# Patient Record
Sex: MALE
[De-identification: names, ages, dates, MRNs, and addresses within clinical notes are randomized per-mention and may not be internally consistent; named-entity substitution may affect disease eponyms.]

---

## 2018-05-03 ENCOUNTER — HOSPITAL ENCOUNTER (OUTPATIENT)
Dept: HOSPITAL 25 - OREAST | Age: 53
Discharge: HOME | End: 2018-05-03
Attending: ORTHOPAEDIC SURGERY
Payer: COMMERCIAL

## 2018-05-03 VITALS — DIASTOLIC BLOOD PRESSURE: 71 MMHG | SYSTOLIC BLOOD PRESSURE: 118 MMHG

## 2018-05-03 DIAGNOSIS — M65.311: ICD-10-CM

## 2018-05-03 DIAGNOSIS — M18.0: Primary | ICD-10-CM

## 2018-05-03 DIAGNOSIS — M19.90: ICD-10-CM

## 2018-05-03 DIAGNOSIS — F17.210: ICD-10-CM

## 2018-05-03 DIAGNOSIS — M72.0: ICD-10-CM

## 2018-05-03 DIAGNOSIS — M24.241: ICD-10-CM

## 2018-05-03 PROCEDURE — 88304 TISSUE EXAM BY PATHOLOGIST: CPT

## 2018-05-03 PROCEDURE — 76000 FLUOROSCOPY <1 HR PHYS/QHP: CPT

## 2018-05-03 PROCEDURE — C1769 GUIDE WIRE: HCPCS

## 2018-05-03 PROCEDURE — C1776 JOINT DEVICE (IMPLANTABLE): HCPCS

## 2018-05-03 PROCEDURE — C1713 ANCHOR/SCREW BN/BN,TIS/BN: HCPCS

## 2018-05-04 NOTE — RAD
INDICATION: Right thumb surgery     



COMPARISON: February 27, 2018



FINDINGS: 35 seconds of fluoroscopy were provided for the orthopedics department.

Fluoroscopic spot imaging of the right hand were obtained for operative control.



CPT II Codes:  (fluoro time doc)

## 2018-05-08 NOTE — OP
DATE OF OPERATION:  05/03/18 - [facility]

 

DATE OF BIRTH:  [birth year].

 

SURGEON:  Rajiv Marroquin MD.

 

ASSISTANT:  CUATE Dennis.  An assistant was needed for the entirety of the 
procedure to aid in positioning of the arm and retraction.

 

ANESTHESIA:  General.

 

PRE-OP DIAGNOSES:

1.  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

2.  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 
to chronic radial collateral ligament incompetency.

3.  Right trigger thumb, improved with injection, but now is recurring.

4.  Dupuytren's disease, right hand first webspace, with resultant first 
webspace contracture.

 

POST-OP DIAGNOSES:

1.  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

2.  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 
to chronic radial collateral ligament incompetency.

3.  Right trigger thumb, improved with injection, but now is recurring.

4.  Dupuytren's disease, right hand first webspace, with resultant first 
webspace contracture.

 

OPERATIVE PROCEDURE:

1.  Right thumb carpometacarpal arthroplasty. Please note that the metacarpal 
bone had been pinned into a now reduced position as prior surgery in the 
setting of extremely abducted and dorsal almost to the base of the second 
metatarsal and the trapezoid bone.  It took an extensive more time to release, 
to reduce, and with suspension more difficult than a typical CMC joint 
arthroplasty.

2.  Distally based flexor carpi radialis tendon transfer for thumb suspension 
and tendon interposition.

2.  Right thumb metacarpophalangeal joint fusion with Acutrak screw.

3.  Right trigger thumb release of A1 pulley.

4.  Excision of Dupuytren's disease of right hand involving the first webspace 
for release of first webspace contracture.

5.  Closure with Z-plasty.

 

INDICATIONS:  Fredi is 52.  He had a right thumb CMC joint arthroplasty done 
back a couple of years ago.  It has never been right ever since it was done. 
The thumb was set in a very odd position. The base of the metacarpal is 
palpable almost dorsal to the trapezoid bone.  There was significant pain 
related to MCP joint DJD.  He did have a trigger thumb pain.  He felt 
substantially better with a steroid injection, but now it is recurring.  
Additionally, he has contracture of the first web space due to Dupuytren's 
disease.

 

ESTIMATED BLOOD LOSS:  2 mL.

 

COMPLICATIONS:  None.

 

FINDINGS:  See above and below.

 

DESCRIPTION OF PROCEDURE:  Fredi was seen in the preoperative holding area.  
The correct side, site and the procedure were identified.  After anesthesia was 
induced, the arm was prepped and draped in the usual fashion.  A time-out was 
performed.

 

The arm was exsanguinated and the tourniquet was inflated.  I then made a 1 cm 
transverse incision in the MCP joint flexion crease.  Dissection was carried 
down. There was a little band of Dupuytren's tissue coming over the A1 pulley; 
this was released.  Full thickness flaps were raised.  The digital nerves were 
protected.  I then released the A1 pulley longitudinally and extended the 
release proximally and distally with the tenotomy scissors.  Once I had 
completed the trigger thumb release, I turned my attention to the Dupuytren's 
disease.

 

I made a transverse incision over the first webspace.  Dissection was carried 
and the skin was released from off the Dupuytren's cord.  I then excised the 
Dupuytren's cord in its entirety.  The digital nerves were protected during the 
excision.  After I had the Dupuytren's disease excised, the contracture opened 
up. The skin was still tight.  Hence, I went ahead and performed a Z-plasty to 
gain some length in the skin closure.  I also closed the trigger thumb release 
wound at this time.

 

I then turned my attention to the base of the joint.  This was very 
degenerative. It was a very large comma type incision extending from the base 
of the thumb metacarpal down in a Johansen type fashion terminating over the FCR 
tendon sheath. The FCR tendon was readily palpable and was intact.  I went 
ahead and brought my incision back dorsally just dorsal to the first dorsal 
compartment tendons.  I used the interval between the first dorsal compartment 
tendons in the EPL.  Full thickness flaps were raised subperiosteally.  There 
was abundant scar tissue as was to be expected at the site of the prior 
arthroplasty, where the trapeziectomy had been performed.  I went ahead and 
debrided all this out.  I then used my [place] blade to mobilize the base of the 
first metacarpal.  This took an extensive amount of time much longer than 
normal.  It was quite difficult, but ultimately I was able to get the thumb 
base mobilized down to the correct reduced position.  The FCR tendon was 
located in the base of the wound after the debridement and it looked native and 
looked like it has never been used previously.  Once I did have the thumb in 
the reduced position, I took sequentially larger drill bits and placed a bone 
tunnel from the proximal radial base of the dorsum of the first metacarpal 
exiting out the proximal ulnar portion of the articular surface of the base of 
the metacarpal.  I then made a 1 cm transverse incision just proximal to the 
wrist flexion crease.  The sheath over the FCR tendon was released.  The tendon 
was split.  A 26-gauge wire was placed in the tendon split, then came proximally
, and made 2 more transverse incisions.  The sheath was released along its 
entirety.  The 26-gauge wire was then delivered into the skin.  This split the 
tendon and released at the proximal musculo-tendinous junction.  The tendon was 
debrided off this.  The tendon was secured with 2-0 Ethibond suture.  The 
tendon tail was delivered down into the base of the wound and the tendon was 
split all the way to the base of the second metacarpal.  I then brought my 
tendon through the bony tunnel and then around the intact limb of the FCR 
tendon. Appropriate tension was set and the tendon transfer was secured with 
multiple 3-0 figure-of-eight Ethibond sutures.  The last two figure-of-eight 
sutures sewed around the intact wound.  The tendon transfer was holding the 
thumb very nicely in a reduced position.  I had examined the scaphotrapezoid 
joint.  There was no significant degenerative joint disease there.  However, I 
rolled up the remainder of my tendon placed it into interposition between the 
base of the thumb metacarpal and the distal border of the scaphoid.  I was then 
able to repair some capsular tissue over the tendon interposition to secure it 
down in the appropriate location and keep it from sliding out.

 

Lastly, I made a longitudinal incision over the thumb metacarpophalangeal 
joint. Dissection was carried down.  The EPL tendon was split.  The MCP joint 
was opened after capsulotomy.  The full-thickness flaps were raised radially 
and ulnarly.  The joint was very degenerative.  I placed a K-wire in the 
central position and used the Acumed small bone reamers to ream both the 
proximal phalanx and the metacarpal head to cancellous bone.  The two bony ends 
were opposed and reduced.  A provisional K-wire was placed ulnarly.  I then 
placed the guidewire for the Acutrak screw, over drilled it and then placed the 
longest standard Acutrak screw available on the set.  This provided excellent 
compression when checked with C-arm imaging. It was in just a few degrees of 
angulation radially, but when completed thumb was seen in a very nice position 
resting on the DIP joint of the index finger. Ultimately, I thought it looked 
very nice, so where as before I tried to provisionally fix it in a more 
straight position and the thumb looked very ulnar deviated I placed it slightly 
radially deviated and it opposed very nicely on the index finger.  Once I had 
the bone nicely opposed, I went ahead and irrigated it out.  I closed the 
capsule to the extent possible with some 4-0 Ethibond suture. The split in the 
tendon was repaired with 4-0 Prolene suture.  All the skin was closed with 4-0 
nylon suture.  The wound was infiltrated with 0.25% plain Marcaine, the wounds 
were dressed, and the thumb spica splint was placed out of the mid portion of 
the distal phalanx.  The tourniquet was deflated.  The hand pinked up 
immediately and then he was woken up and taken to recovery room in stable 
condition.

 

 [id number]/CPS #: [id number]

MATTHEW

## 2019-12-08 ENCOUNTER — HOSPITAL ENCOUNTER (EMERGENCY)
Dept: HOSPITAL 25 - UCCORT | Age: 54
Discharge: HOME | End: 2019-12-08
Payer: COMMERCIAL

## 2019-12-08 VITALS — SYSTOLIC BLOOD PRESSURE: 143 MMHG | DIASTOLIC BLOOD PRESSURE: 77 MMHG

## 2019-12-08 DIAGNOSIS — F17.210: ICD-10-CM

## 2019-12-08 DIAGNOSIS — Z88.2: ICD-10-CM
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
## 2019-12-08 DIAGNOSIS — S61.211A: Primary | ICD-10-CM
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
## 2019-12-08 PROCEDURE — G0463 HOSPITAL OUTPT CLINIC VISIT: HCPCS

## 2019-12-08 PROCEDURE — 12002 RPR S/N/AX/GEN/TRNK2.6-7.5CM: CPT

## 2019-12-08 PROCEDURE — 99212 OFFICE O/P EST SF 10 MIN: CPT

## 2019-12-08 NOTE — UC
Laceration HPI





- HPI Summary


HPI Summary: 


Patient  is  54 year old gentleman, who  present today  to the urgent care with 

left index laceration that happened at 5 PM today.


Today 1700 pt was cutting kindling wood w/ small hatchet. Accidently cut LEFT 

index finger during this activity. +bleed. Applied pressure dressing at home. 

Last tetanus Sep 2018.  




















- History Of Current Complaint


Chief Complaint: UCLaceration


Stated Complaint: LACERATION LEFT INDEX FINGER


Time Seen by Provider: 12/08/19 18:09


Hx Obtained From: Patient


Pain Intensity: 6





- Allergies/Home Medications


Allergies/Adverse Reactions: 


 Allergies











Allergy/AdvReac Type Severity Reaction Status Date / Time


 


Sulfa (Sulfonamide Allergy Severe rash, flu Verified 12/08/19 17:59





Antibiotics)   like  





   symptoms  














PMH/Surg Hx/FS Hx/Imm Hx





- Additional Past Medical History


Additional PMH: 


Past Medical History : Hepatitis as a child, resolved


Past Surgical History: right thumb 2017


left knee replacement 12/2016


revision left knee 12/2017


left foot ligament replacement


Family History : non contributory 


Social History : No alcohol,Daily smoker, no drug use. 








Previously Healthy: Yes





- Surgical History


Surgical History: Yes


Surgery Procedure, Year, and Place: right thumb 2017.  left knee replacement 12/ 2016.  revision left knee 12/2017.  left foot ligament replacement





- Family History


Known Family History: Positive: Non-Contributory





- Social History


Alcohol Use: None


Substance Use Type: None


Smoking Status (MU): Current Every Day Smoker


Amount Used/How Often: 10 cigarettes a day, smoked for 40 years





Review of Systems


All Other Systems Reviewed And Are Negative: Yes


Constitutional: Positive: Negative


Skin: Positive: Other - Laceration of left index finger


Eyes: Positive: Negative


ENT: Positive: Negative


Respiratory: Positive: Negative


Cardiovascular: Positive: Negative


Gastrointestinal: Positive: Negative


Genitourinary: Positive: Negative


Motor: Positive: Negative


Neurovascular: Positive: Negative


Musculoskeletal: Positive: Negative


Neurological: Positive: Negative


Psychological: Positive: Negative


Is Patient Immunocompromised?: No





Physical Exam





- Summary


Physical Exam Summary: 


Vital Signs Reviewed: Yes


A+Ox3, no distress


Eyes: Conjunctiva Clear


ENT: Hearing grossly normal


neck: supple


Respiratory: Positive: No respiratory distress, No accessory muscle use


Cardiovascular: skin color reflect adequate perfusion Musculoskeletal Exam: FORDE 

x 4 without difficulty


Neurological: Positive: Alert,  ambulatory without difficulty


Psychological: Positive: Normal Response To Family


Skin: Left index finger with a V-shaped laceration on the medial aspect of 

proximal phalanx measuring 5 cm. point of the V is proximal. Flap type of 

laceration.  Examination after everting the flap does not demonstrate extension 

to the bone. 








Triage Information Reviewed: Yes


Vital Signs: 


 Initial Vital Signs











Temp  98.3 F   12/08/19 18:00


 


Pulse  87   12/08/19 18:00


 


Resp  16   12/08/19 18:00


 


BP  143/77   12/08/19 18:00


 


Pulse Ox  98   12/08/19 18:00











Vital Signs Reviewed: Yes





Images


Hands: 


  __________________________














  __________________________





 1 - V-shaped flap type laceration








Laceration Repair





- Laceration Repair


  ** 1


Procedure Summary: 


V-shaped flap-type laceration repaired with good approximation of the skin with 

16 5-0 nylon sutures under local anesthesia





Laceration Size After Repair: Length (cm) - 5, Width (mm) - Flap type, Depth (mm

) - 3


Type Injection: Local


Anesthesia Used: 1.0% Lido


Cleansing Completed Via Routine Prep: Yes


Irrigation With Pressure Irrigation Device: Yes


Closure Material: Sutures


Closure Method: Single Layer


Suture Of: Skin


Suture Type: Nylon - 16 sutures with 5-0 nylon





Laceration Course/Dx





- Course/Dx


Course Of Treatment: 


The wound was irrigated with normal saline and no foreign body was identified.  

Upon examination there was no extension of the wound to the bone.


Laceration was repaired with 16 5-0 nylon sutures with good approximation.


He was given 1 dose of Keflex here and rest was prescribed to the pharmacy.


He is a patient of Dr. Marroquin and we discussed that he can follow up with him 

in 1-2 days for recheck and can get the sutures out in 7-10 days.














- Diagnosis


Provider Diagnosis: 


 Laceration of left index finger








Discharge ED





- Sign-Out/Discharge


Documenting (check all that apply): Patient Departure


All imaging exams completed and their final reports reviewed: No Studies





- Discharge Plan


Condition: Stable


Disposition: HOME


Prescriptions: 


Cephalexin CAP* [Keflex CAP*] 500 mg PO TID 7 Days #21 cap


Patient Education Materials:  Care For Your Stitches (ED), Laceration (ED)


Referrals: 


Roney Marrero MD [Primary Care Provider] - 


Rajiv Marroquin MD [Medical Doctor] - 2 Days


Additional Instructions: 


First dose of antibiotic is given today.  I have prescribed rest of the 

medication to the pharmacy.


Please follow up with Dr. Marroquin in a day or 2 for recheck.


Monitor for any signs of infection.


Sutures can be taken out in 10 days.


You can return to the urgent care for suture removal.


Patients blood pressure slightly high in Urgent care today  , plan follow up 

with PCP for better control 


Return to Urgent care / ER if symptoms get worse. 


 





- Billing Disposition and Condition


Condition: STABLE


Disposition: Home

## 2020-02-06 ENCOUNTER — HOSPITAL ENCOUNTER (OUTPATIENT)
Dept: HOSPITAL 25 - OREAST | Age: 55
Discharge: HOME | End: 2020-02-06
Attending: ORTHOPAEDIC SURGERY
Payer: COMMERCIAL

## 2020-02-06 VITALS — DIASTOLIC BLOOD PRESSURE: 65 MMHG | SYSTOLIC BLOOD PRESSURE: 113 MMHG

## 2020-02-06 DIAGNOSIS — M24.242: ICD-10-CM

## 2020-02-06 DIAGNOSIS — M79.5: ICD-10-CM

## 2020-02-06 DIAGNOSIS — F17.210: ICD-10-CM

## 2020-02-06 DIAGNOSIS — M19.90: ICD-10-CM

## 2020-02-06 DIAGNOSIS — Z96.659: ICD-10-CM

## 2020-02-06 DIAGNOSIS — M18.12: Primary | ICD-10-CM

## 2020-02-06 PROCEDURE — 88304 TISSUE EXAM BY PATHOLOGIST: CPT

## 2020-02-06 PROCEDURE — C1769 GUIDE WIRE: HCPCS

## 2020-02-06 PROCEDURE — 76000 FLUOROSCOPY <1 HR PHYS/QHP: CPT

## 2020-02-06 PROCEDURE — C1713 ANCHOR/SCREW BN/BN,TIS/BN: HCPCS

## 2020-02-06 PROCEDURE — 88311 DECALCIFY TISSUE: CPT
